# Patient Record
Sex: MALE | Race: WHITE | NOT HISPANIC OR LATINO | URBAN - METROPOLITAN AREA
[De-identification: names, ages, dates, MRNs, and addresses within clinical notes are randomized per-mention and may not be internally consistent; named-entity substitution may affect disease eponyms.]

---

## 2024-08-09 ENCOUNTER — OFFICE (OUTPATIENT)
Dept: URBAN - METROPOLITAN AREA CLINIC 9 | Facility: CLINIC | Age: 44
End: 2024-08-09
Payer: COMMERCIAL

## 2024-08-09 VITALS
WEIGHT: 310 LBS | OXYGEN SATURATION: 98 % | HEART RATE: 69 BPM | DIASTOLIC BLOOD PRESSURE: 71 MMHG | SYSTOLIC BLOOD PRESSURE: 115 MMHG | HEIGHT: 71 IN

## 2024-08-09 DIAGNOSIS — E11.9 TYPE 2 DIABETES MELLITUS WITHOUT COMPLICATIONS: ICD-10-CM

## 2024-08-09 DIAGNOSIS — I48.91 UNSPECIFIED ATRIAL FIBRILLATION: ICD-10-CM

## 2024-08-09 DIAGNOSIS — D45 POLYCYTHEMIA VERA: ICD-10-CM

## 2024-08-09 PROCEDURE — 99203 OFFICE O/P NEW LOW 30 MIN: CPT | Performed by: NURSE PRACTITIONER

## 2024-08-09 RX ORDER — SODIUM PICOSULFATE, MAGNESIUM OXIDE, AND ANHYDROUS CITRIC ACID 12; 3.5; 1 G/175ML; G/175ML; MG/175ML
LIQUID ORAL
Qty: 1 | Refills: 0 | Status: ACTIVE
Start: 2024-08-09

## 2024-08-09 NOTE — SERVICEHPINOTES
Mr. Powell is a very pleasant 43 year old  male PMH significant for AFib, DM, Polycythemia Vera, and low testosterone who presents today for consultation for screening colonoscopy.  He reports he has never had a colonoscopy before.  He reports a family history of colon cancer in first degree relative noting his father was diagnosed at age 73.  He is having regular daily bowel movements that are soft and formed.  No hematochezia, melena, or mucoid stool.  He does not report any upper GI symptoms at this time.  He is being followed by Dr. Gray for history of polycythemia vera managed currently with serial phlebotomy with most recent visit and lab being 07/30/24.  Lab viewed on patient's phone as follows: RBC 6.47, Hgb 15.8, Hct 48.9, Platelets 260, Ferritin 12, Iron 30, Iron Sat *%.  LFTs WNL.  He will continue serial phlebotomy q3 months for now, next visit is October 2024.  He is also being followed by SHELLI Carranza at [facility] for Afib and currently managed with Eliquis x1 year which is well controlled.  He is in the process of transitioning care to Dr. [facility].  We will obtain CC from cardiology to proceed with procedures and plan to press forward with screening colonoscopy, patient aware and agreeable to plan.